# Patient Record
Sex: MALE | Race: WHITE | Employment: FULL TIME | URBAN - METROPOLITAN AREA
[De-identification: names, ages, dates, MRNs, and addresses within clinical notes are randomized per-mention and may not be internally consistent; named-entity substitution may affect disease eponyms.]

---

## 2017-02-14 PROBLEM — M70.21 OLECRANON BURSITIS OF RIGHT ELBOW: Status: ACTIVE | Noted: 2017-02-14

## 2017-06-12 PROBLEM — Z47.89 ORTHOPEDIC AFTERCARE: Status: ACTIVE | Noted: 2017-06-12

## 2019-11-22 PROCEDURE — 88305 TISSUE EXAM BY PATHOLOGIST: CPT | Performed by: INTERNAL MEDICINE

## 2021-11-20 ENCOUNTER — IMMUNIZATION (OUTPATIENT)
Dept: LAB | Facility: HOSPITAL | Age: 56
End: 2021-11-20
Attending: EMERGENCY MEDICINE
Payer: COMMERCIAL

## 2021-11-20 DIAGNOSIS — Z23 NEED FOR VACCINATION: Primary | ICD-10-CM

## 2021-11-20 PROCEDURE — 0004A SARSCOV2 VAC 30MCG/0.3ML IM: CPT | Performed by: NURSE PRACTITIONER

## 2021-12-01 PROBLEM — S83.232A COMPLEX TEAR OF MEDIAL MENISCUS OF LEFT KNEE AS CURRENT INJURY: Status: ACTIVE | Noted: 2021-12-01

## 2021-12-17 PROBLEM — G89.29 CHRONIC PAIN OF LEFT KNEE: Status: ACTIVE | Noted: 2021-12-17

## 2021-12-17 PROBLEM — M25.562 CHRONIC PAIN OF LEFT KNEE: Status: ACTIVE | Noted: 2021-12-17

## 2025-03-10 ENCOUNTER — APPOINTMENT (OUTPATIENT)
Dept: CT IMAGING | Facility: HOSPITAL | Age: 60
End: 2025-03-10
Attending: EMERGENCY MEDICINE
Payer: COMMERCIAL

## 2025-03-10 ENCOUNTER — HOSPITAL ENCOUNTER (EMERGENCY)
Facility: HOSPITAL | Age: 60
Discharge: HOME | End: 2025-03-11
Attending: EMERGENCY MEDICINE
Payer: COMMERCIAL

## 2025-03-10 ENCOUNTER — APPOINTMENT (OUTPATIENT)
Dept: MRI IMAGING | Facility: HOSPITAL | Age: 60
End: 2025-03-10
Attending: EMERGENCY MEDICINE
Payer: COMMERCIAL

## 2025-03-10 DIAGNOSIS — E86.0 DEHYDRATION: Primary | ICD-10-CM

## 2025-03-10 DIAGNOSIS — H53.8 BLURRED VISION: ICD-10-CM

## 2025-03-10 DIAGNOSIS — R55 SYNCOPE, NEAR: ICD-10-CM

## 2025-03-10 LAB
ALBUMIN SERPL-MCNC: 4.8 G/DL (ref 3.2–4.8)
ALBUMIN/GLOB SERPL: 2 {RATIO} (ref 1–2)
ALP LIVER SERPL-CCNC: 81 U/L
ALT SERPL-CCNC: 21 U/L
ANION GAP SERPL CALC-SCNC: 7 MMOL/L (ref 0–18)
AST SERPL-CCNC: 21 U/L (ref ?–34)
BASOPHILS # BLD AUTO: 0.03 X10(3) UL (ref 0–0.2)
BASOPHILS NFR BLD AUTO: 0.5 %
BILIRUB SERPL-MCNC: 0.5 MG/DL (ref 0.3–1.2)
BUN BLD-MCNC: 17 MG/DL (ref 9–23)
CALCIUM BLD-MCNC: 9.4 MG/DL (ref 8.7–10.6)
CHLORIDE SERPL-SCNC: 104 MMOL/L (ref 98–112)
CO2 SERPL-SCNC: 28 MMOL/L (ref 21–32)
CREAT BLD-MCNC: 1.52 MG/DL
EGFRCR SERPLBLD CKD-EPI 2021: 52 ML/MIN/1.73M2 (ref 60–?)
EOSINOPHIL # BLD AUTO: 0.18 X10(3) UL (ref 0–0.7)
EOSINOPHIL NFR BLD AUTO: 3.1 %
ERYTHROCYTE [DISTWIDTH] IN BLOOD BY AUTOMATED COUNT: 13.1 %
GLOBULIN PLAS-MCNC: 2.4 G/DL (ref 2–3.5)
GLUCOSE BLD-MCNC: 173 MG/DL (ref 70–99)
HCT VFR BLD AUTO: 41.5 %
HGB BLD-MCNC: 14.3 G/DL
IMM GRANULOCYTES # BLD AUTO: 0.02 X10(3) UL (ref 0–1)
IMM GRANULOCYTES NFR BLD: 0.3 %
LYMPHOCYTES # BLD AUTO: 1.63 X10(3) UL (ref 1–4)
LYMPHOCYTES NFR BLD AUTO: 28.5 %
MCH RBC QN AUTO: 31.4 PG (ref 26–34)
MCHC RBC AUTO-ENTMCNC: 34.5 G/DL (ref 31–37)
MCV RBC AUTO: 91 FL
MONOCYTES # BLD AUTO: 0.72 X10(3) UL (ref 0.1–1)
MONOCYTES NFR BLD AUTO: 12.6 %
NEUTROPHILS # BLD AUTO: 3.14 X10 (3) UL (ref 1.5–7.7)
NEUTROPHILS # BLD AUTO: 3.14 X10(3) UL (ref 1.5–7.7)
NEUTROPHILS NFR BLD AUTO: 55 %
OSMOLALITY SERPL CALC.SUM OF ELEC: 294 MOSM/KG (ref 275–295)
PLATELET # BLD AUTO: 159 10(3)UL (ref 150–450)
POTASSIUM SERPL-SCNC: 3.9 MMOL/L (ref 3.5–5.1)
PROT SERPL-MCNC: 7.2 G/DL (ref 5.7–8.2)
RBC # BLD AUTO: 4.56 X10(6)UL
SODIUM SERPL-SCNC: 139 MMOL/L (ref 136–145)
WBC # BLD AUTO: 5.7 X10(3) UL (ref 4–11)

## 2025-03-10 PROCEDURE — 70553 MRI BRAIN STEM W/O & W/DYE: CPT | Performed by: EMERGENCY MEDICINE

## 2025-03-10 PROCEDURE — 99285 EMERGENCY DEPT VISIT HI MDM: CPT

## 2025-03-10 PROCEDURE — 93010 ELECTROCARDIOGRAM REPORT: CPT

## 2025-03-10 PROCEDURE — 80053 COMPREHEN METABOLIC PANEL: CPT | Performed by: EMERGENCY MEDICINE

## 2025-03-10 PROCEDURE — 70450 CT HEAD/BRAIN W/O DYE: CPT | Performed by: EMERGENCY MEDICINE

## 2025-03-10 PROCEDURE — 80053 COMPREHEN METABOLIC PANEL: CPT

## 2025-03-10 PROCEDURE — 96360 HYDRATION IV INFUSION INIT: CPT

## 2025-03-10 PROCEDURE — 96361 HYDRATE IV INFUSION ADD-ON: CPT

## 2025-03-10 PROCEDURE — 85025 COMPLETE CBC W/AUTO DIFF WBC: CPT | Performed by: EMERGENCY MEDICINE

## 2025-03-10 PROCEDURE — 70549 MR ANGIOGRAPH NECK W/O&W/DYE: CPT | Performed by: EMERGENCY MEDICINE

## 2025-03-10 PROCEDURE — 93005 ELECTROCARDIOGRAM TRACING: CPT

## 2025-03-10 PROCEDURE — 85025 COMPLETE CBC W/AUTO DIFF WBC: CPT

## 2025-03-10 PROCEDURE — 70546 MR ANGIOGRAPH HEAD W/O&W/DYE: CPT | Performed by: EMERGENCY MEDICINE

## 2025-03-10 RX ORDER — METFORMIN HYDROCHLORIDE 750 MG/1
750 TABLET, EXTENDED RELEASE ORAL
COMMUNITY

## 2025-03-10 RX ORDER — TADALAFIL 10 MG/1
10 TABLET ORAL
COMMUNITY

## 2025-03-10 RX ORDER — PENTOXIFYLLINE 400 MG/1
400 TABLET, EXTENDED RELEASE ORAL
COMMUNITY

## 2025-03-11 VITALS
OXYGEN SATURATION: 98 % | SYSTOLIC BLOOD PRESSURE: 130 MMHG | HEART RATE: 65 BPM | TEMPERATURE: 99 F | BODY MASS INDEX: 31.44 KG/M2 | RESPIRATION RATE: 17 BRPM | WEIGHT: 245 LBS | DIASTOLIC BLOOD PRESSURE: 87 MMHG | HEIGHT: 74 IN

## 2025-03-11 PROCEDURE — A9575 INJ GADOTERATE MEGLUMI 0.1ML: HCPCS | Performed by: EMERGENCY MEDICINE

## 2025-03-11 RX ORDER — GADOTERATE MEGLUMINE 376.9 MG/ML
20 INJECTION INTRAVENOUS
Status: COMPLETED | OUTPATIENT
Start: 2025-03-11 | End: 2025-03-11

## 2025-03-11 NOTE — ED PROVIDER NOTES
Patient Seen in: [facility] Emergency Department      History     Chief Complaint   Patient presents with    Syncope     Stated Complaint: near syncope    Subjective:   Patient 59-year-old male who presents emergency room for evaluation of your syncopal episode.  Patient was driving a vehicle outside and started having blurred vision.  The last approximately 2 minutes that has since resolved.  Patient reports that back to baseline.  No focal deficits on exam NIH score 0.  Patient has never had this previously.  Patient has mild dehydration seen on blood work.  He will be given IV fluids.  Patient reports a tingling sensation in his head following the episode.  He did feel very panicked after the event per family he was also very anxious on arrival home.    The history is provided by the patient and the spouse.             Objective:     Past Medical History:    COVID    1/2021 brain fog    Diabetes (HCC)              Past Surgical History:   Procedure Laterality Date    Colonoscopy  11/22/2019    2 small adenomas. Recall 5 years.     Orthopedic surg (pbp)      RIGHT KNEE MENISCAL REPAIR    Other  LEFT LEG VEIN LIGATION    Other  ORAL SURGERY    Other surgical history      VENIAL LIGATION L LEG    Other surgical history      MENISCUS REPAIR R LEG    Other surgical history  7/16/13    ORIF left fibula shaft fracture                Social History     Socioeconomic History    Marital status:    Tobacco Use    Smoking status: Never    Smokeless tobacco: Never   Substance and Sexual Activity    Alcohol use: Yes     Comment: 2 DRINKS DAILY will hold 24 hours prior    Drug use: No   Social History Narrative    ** Merged History Encounter **          Social Drivers of Health     Food Insecurity: No Food Insecurity (3/26/2024)    Received from [facility]    Hunger Vital Sign     Worried About Running Out of Food in the Last Year: Never true     Ran Out of Food in the Last Year: Never true    Transportation Needs: No Transportation Needs (3/26/2024)    Received from [facility]    PRAPARE - Transportation     Lack of Transportation (Medical): No     Lack of Transportation (Non-Medical): No   Housing Stability: Low Risk  (3/26/2024)    Received from [facility]    Housing Stability Vital Sign     Unable to Pay for Housing in the Last Year: No     Number of Places Lived in the Last Year: 1     Unstable Housing in the Last Year: No                  Physical Exam     ED Triage Vitals [03/10/25 2031]   /83   Pulse 70   Resp 18   Temp 98.7 °F (37.1 °C)   Temp src Temporal   SpO2 96 %   O2 Device None (Room air)       Current Vitals:   Vital Signs  BP: 130/87  Pulse: 65  Resp: 17  Temp: 98.7 °F (37.1 °C)  Temp src: Temporal  MAP (mmHg): (!) 107    Oxygen Therapy  SpO2: 98 %  O2 Device: None (Room air)        Physical Exam  Vitals and nursing note reviewed.   Constitutional:       General: He is not in acute distress.     Appearance: Normal appearance. He is normal weight. He is not toxic-appearing.   HENT:      Head: Normocephalic and atraumatic.      Nose: Nose normal.      Mouth/Throat:      Mouth: Mucous membranes are moist.   Eyes:      Extraocular Movements: Extraocular movements intact.      Pupils: Pupils are equal, round, and reactive to light.   Cardiovascular:      Rate and Rhythm: Normal rate and regular rhythm.      Pulses: Normal pulses.   Pulmonary:      Effort: Pulmonary effort is normal.      Breath sounds: Normal breath sounds.   Abdominal:      General: Bowel sounds are normal. There is no distension.      Palpations: Abdomen is soft.      Tenderness: There is no abdominal tenderness.   Musculoskeletal:         General: Normal range of motion.   Skin:     General: Skin is warm.      Capillary Refill: Capillary refill takes less than 2 seconds.   Neurological:      General: No focal deficit present.      Mental Status: He is alert and oriented  to person, place, and time.      Cranial Nerves: No cranial nerve deficit.      Sensory: No sensory deficit.      Motor: No weakness.   Psychiatric:         Mood and Affect: Mood normal.         Behavior: Behavior normal.             ED Course     Labs Reviewed   COMP METABOLIC PANEL (14) - Abnormal; Notable for the following components:       Result Value    Glucose 173 (*)     Creatinine 1.52 (*)     eGFR-Cr 52 (*)     All other components within normal limits   CBC WITH DIFFERENTIAL WITH PLATELET   RAINBOW DRAW LAVENDER   RAINBOW DRAW LIGHT GREEN   RAINBOW DRAW BLUE     EKG    Rate, intervals and axes as noted on EKG Report.  Rate: 76    Rhythm: Sinus Rhythm  Reading: Normal sinus rhythm no ST elevation OH interval of 164 QRS of 112 QTc 445 with axes of 56/10/31                CT BRAIN OR HEAD (CPT=70450)    Result Date: 3/10/2025  PROCEDURE:  CT BRAIN OR HEAD (36912)  COMPARISON:  None.  INDICATIONS:  near syncope  TECHNIQUE:  Noncontrast CT scanning is performed through the brain. Dose reduction techniques were used. Dose information is transmitted to the ACR (American College of Radiology) NRDR (National Radiology Data Registry) which includes the Dose Index Registry.  PATIENT STATED HISTORY: (As transcribed by Technologist)  Patient had a near syncopal episode with blurred vision and lightheadedness. Now complains of fatigue and headache.    FINDINGS:  VENTRICLES/SULCI:  Ventricles and sulci are normal in size.  INTRACRANIAL:  There are no abnormal extraaxial fluid collections.  There is no midline shift.  There are no intraparenchymal brain abnormalities.  There is nothing specific for acute infarct.  There is no hemorrhage or mass lesion.  There is normal gray-white differentiation.  Beam hardening artifact limits evaluation of the anterior temporal lobes. SINUSES:           No sign of acute sinusitis.  3 mm retention cyst or polyp seen in the right maxillary sinus. MASTOIDS:          No sign of acute  inflammation. SKULL:             No evidence for fracture or osseous abnormality. OTHER:             None.            CONCLUSION:  No acute intracranial hemorrhage, mass effect or midline shift.  If clinical symptoms persist, consider follow-up MRI.    LOCATION:  [facility]   Dictated by (CST): Marlo Rivas MD on 3/10/2025 at 10:44 PM     Finalized by (CST): Marlo Rivas MD on 3/10/2025 at 10:46 PM        MRI of the brain shows no acute intracranial infarct hemorrhage hydrocephalus or herniation no abnormal parenchymal leptomeningeal or dural enhancement.  Angiogram shows cervical carotid and vertebral arteries are patent without high-grade stenosis or evidence for dissection.  Anterior and posterior injury cranial circulation are patent without evidence of large vessel occlusion or aneurysm.     MDM      Social -negative tobacco, occasional etoh, negative drugs  Family History-noncontributory  Past Medical History-diabetes    Differential diagnosis before testing included dehydration, electrolyte abnormality, anxiety attack, acute coronary syndrome, TIA, CVA    Co-morbidities that add to the complexity of management include: Diabetes    Testing ordered during this visit included CT scan of the brain MRI of the brain if CT is negative baseline labs EKG Accu-Chek was normal    Radiographic images  I personally reviewed the radiographs and my individual interpretation shows no acute process, MRI of the brain shows no acute process  I also reviewed the official reports that showed CT BRAIN OR HEAD (CPT=70450)    Result Date: 3/10/2025  PROCEDURE:  CT BRAIN OR HEAD (72342)  COMPARISON:  None.  INDICATIONS:  near syncope  TECHNIQUE:  Noncontrast CT scanning is performed through the brain. Dose reduction techniques were used. Dose information is transmitted to the ACR (American College of Radiology) NRDR (National Radiology Data Registry) which includes the Dose Index Registry.  PATIENT STATED HISTORY: (As  transcribed by Technologist)  Patient had a near syncopal episode with blurred vision and lightheadedness. Now complains of fatigue and headache.    FINDINGS:  VENTRICLES/SULCI:  Ventricles and sulci are normal in size.  INTRACRANIAL:  There are no abnormal extraaxial fluid collections.  There is no midline shift.  There are no intraparenchymal brain abnormalities.  There is nothing specific for acute infarct.  There is no hemorrhage or mass lesion.  There is normal gray-white differentiation.  Beam hardening artifact limits evaluation of the anterior temporal lobes. SINUSES:           No sign of acute sinusitis.  3 mm retention cyst or polyp seen in the right maxillary sinus. MASTOIDS:          No sign of acute inflammation. SKULL:             No evidence for fracture or osseous abnormality. OTHER:             None.            CONCLUSION:  No acute intracranial hemorrhage, mass effect or midline shift.  If clinical symptoms persist, consider follow-up MRI.    LOCATION:  [facility]   Dictated by (CST): Marlo Rivas MD on 3/10/2025 at 10:44 PM     Finalized by (CST): Marlo Rivas MD on 3/10/2025 at 10:46 PM        CT BRAIN OR HEAD (CPT=70450)    Result Date: 3/10/2025  PROCEDURE:  CT BRAIN OR HEAD (54426)  COMPARISON:  None.  INDICATIONS:  near syncope  TECHNIQUE:  Noncontrast CT scanning is performed through the brain. Dose reduction techniques were used. Dose information is transmitted to the ACR (American College of Radiology) NRDR (National Radiology Data Registry) which includes the Dose Index Registry.  PATIENT STATED HISTORY: (As transcribed by Technologist)  Patient had a near syncopal episode with blurred vision and lightheadedness. Now complains of fatigue and headache.    FINDINGS:  VENTRICLES/SULCI:  Ventricles and sulci are normal in size.  INTRACRANIAL:  There are no abnormal extraaxial fluid collections.  There is no midline shift.  There are no intraparenchymal brain abnormalities.  There is  nothing specific for acute infarct.  There is no hemorrhage or mass lesion.  There is normal gray-white differentiation.  Beam hardening artifact limits evaluation of the anterior temporal lobes. SINUSES:           No sign of acute sinusitis.  3 mm retention cyst or polyp seen in the right maxillary sinus. MASTOIDS:          No sign of acute inflammation. SKULL:             No evidence for fracture or osseous abnormality. OTHER:             None.            CONCLUSION:  No acute intracranial hemorrhage, mass effect or midline shift.  If clinical symptoms persist, consider follow-up MRI.    LOCATION:  [facility]   Dictated by (CST): Marlo Rivas MD on 3/10/2025 at 10:44 PM     Finalized by (CST): Marlo Rivas MD on 3/10/2025 at 10:46 PM        MRI of the brain shows no acute intracranial infarct hemorrhage hydrocephalus or herniation no abnormal parenchymal leptomeningeal or dural enhancement.  Angiogram shows cervical carotid and vertebral arteries are patent without high-grade stenosis or evidence for dissection.  Anterior and posterior injury cranial circulation are patent without evidence of large vessel occlusion or aneurysm.      External chart review showed review of Care Everywhere in epic system shows no related comorbidities to current presentation    History obtained by an independent source included from patient, family    Discussion of management with patient, family    Social determinants of health that affect care include not applicable      Medications Provided: IV fluid    Course of Events during Emergency Room Visit include 15-year-old male presents emergency room for evaluation of blurred vision episode lasting 2 minutes approximately 620 this evening.  Patient's NIH score is 0 he is not a candidate for TNK as his symptoms are completely resolved and no signs of any deficits.  Patient will get CT scan of the brain and if negative will get MRI of the brain.  Will get baseline labs give IV  fluids for mild dehydration.  Patient to follow-up with primary care physician          Disposition:          Discharge  I have discussed with the patient the results of test, differential diagnosis, treatment plan, warning signs and symptoms which should prompt immediate return.  They expressed understanding of these instructions and agrees to the following plan provided.  They were given written discharge instructions and agrees to return for any concerns and voiced understanding and all questions were answered.           Medical Decision Making      Disposition and Plan     Clinical Impression:  1. Dehydration    2. Syncope, near    3. Blurred vision         Disposition:  Discharge  3/11/2025  2:26 am    Follow-up:  Eddie Andersen III  [id number] [id number] [phone]    Schedule an appointment as soon as possible for a visit            Medications Prescribed:  Current Discharge Medication List              Supplementary Documentation:

## 2025-03-11 NOTE — ED INITIAL ASSESSMENT (HPI)
Patient carlita had a near panic attack /fainting spell while driving. He did not pass out- was able to pull over and it passed. Symptoms last less than 5 mins. Patient carlita was having blurry vision, lightheaded. Occurred around 6:20 pm. Patient carlita feels back to normal except tired and a headache.     FAST negative.

## 2025-03-12 LAB
ATRIAL RATE: 76 BPM
P AXIS: 56 DEGREES
P-R INTERVAL: 164 MS
Q-T INTERVAL: 394 MS
QRS DURATION: 112 MS
QTC CALCULATION (BEZET): 443 MS
R AXIS: 10 DEGREES
T AXIS: 31 DEGREES
VENTRICULAR RATE: 76 BPM